# Patient Record
Sex: MALE | Race: WHITE | URBAN - METROPOLITAN AREA
[De-identification: names, ages, dates, MRNs, and addresses within clinical notes are randomized per-mention and may not be internally consistent; named-entity substitution may affect disease eponyms.]

---

## 2022-03-11 ENCOUNTER — TELEPHONE (OUTPATIENT)
Dept: BARIATRICS/WEIGHT MGMT | Age: 22
End: 2022-03-11

## 2022-04-14 ENCOUNTER — OFFICE VISIT (OUTPATIENT)
Dept: BARIATRICS/WEIGHT MGMT | Age: 22
End: 2022-04-14
Payer: COMMERCIAL

## 2022-04-14 ENCOUNTER — TELEPHONE (OUTPATIENT)
Dept: BARIATRICS/WEIGHT MGMT | Age: 22
End: 2022-04-14

## 2022-04-14 VITALS
BODY MASS INDEX: 45.1 KG/M2 | HEIGHT: 70 IN | TEMPERATURE: 97.9 F | DIASTOLIC BLOOD PRESSURE: 70 MMHG | HEART RATE: 68 BPM | WEIGHT: 315 LBS | SYSTOLIC BLOOD PRESSURE: 135 MMHG

## 2022-04-14 DIAGNOSIS — E66.01 CLASS 3 SEVERE OBESITY DUE TO EXCESS CALORIES WITHOUT SERIOUS COMORBIDITY WITH BODY MASS INDEX (BMI) OF 60.0 TO 69.9 IN ADULT (HCC): ICD-10-CM

## 2022-04-14 DIAGNOSIS — G47.33 OSA (OBSTRUCTIVE SLEEP APNEA): Primary | ICD-10-CM

## 2022-04-14 PROBLEM — E66.813 CLASS 3 SEVERE OBESITY DUE TO EXCESS CALORIES WITHOUT SERIOUS COMORBIDITY WITH BODY MASS INDEX (BMI) OF 60.0 TO 69.9 IN ADULT (HCC): Status: ACTIVE | Noted: 2022-04-14

## 2022-04-14 PROCEDURE — 99204 OFFICE O/P NEW MOD 45 MIN: CPT | Performed by: INTERNAL MEDICINE

## 2022-04-14 PROCEDURE — 99202 OFFICE O/P NEW SF 15 MIN: CPT

## 2022-04-14 RX ORDER — TOPIRAMATE 25 MG/1
25 TABLET ORAL 2 TIMES DAILY
Qty: 120 TABLET | Refills: 1 | Status: SHIPPED
Start: 2022-04-14 | End: 2022-08-24 | Stop reason: SDUPTHER

## 2022-04-14 RX ORDER — LIRAGLUTIDE 6 MG/ML
INJECTION, SOLUTION SUBCUTANEOUS
Qty: 5 PEN | Refills: 3 | Status: SHIPPED
Start: 2022-04-14 | End: 2022-05-27

## 2022-04-14 NOTE — PATIENT INSTRUCTIONS
Breakfast -     one high protein shake                            + 20 grams of fiber. Do this by either eating 12 tablespoons of the original, plain Fiber One cereal every day or 4 tablespoons of wheat dextrin powder (Benefiber or a generic brand) every day. Work up to this amount slowly by starting with only one-eighth to one-fourth of the target amount and then adding another one-eighth to one-fourth every one or two weeks until reaching the target. Lunch -           one high protein shake                           + one a fat snack item     Dinner -          one frozen meal                           + one snack item     Shake options (<200 sam, >25 grams/protein) :  Nectar, Pure Protein, Premier, Fairlife, Boost Max, [employer] Max, BeneProtein and [place] [employer] (which is lactose-free) are milk-based options; Nectar, Premier Protein Clear, IsoPure Protein Drink, and Protein 2 O are water-based options; (Premier Protein Clear, the water-based option, comes in a 20 oz bottle with 20 grams of protein and 90 calories. So you have to drink three each day which increases the cost.)  (Disclaimer: Dietary supplements rarely have their listed ingredients and the amount of each verified by a third party other. Sometimes they give verification for their claims to be GMO and gluten free and to be organic.  However, even such verifications as these may still be untrustworthy.)     Fat snack options (<150 sam, >11 grams of fat): 1 1/2 tablespoon of a oil-based dressing or 4 tablespoons of [place] dressing on a bed of salad greens     Snack options (<100 sam, no sweets): fruit, low fat/high protein [place] yogurt, mozzarella cheese stick, nuts, salad with dressing, peanut butter, chips/crackers/pretzels     Frozen meal options (<350 sam):  Weight Watchers Smart Ones, [employer] Cuisine, Healthy Choice, Zeinab's, Patricia's, etc     Food substitutes for the shakes:  4 oz of baked, grilled or broiled chicken, turkey or fish, 10 egg whites Take a multivitamin daily     Walk 30 min for exercise every day    Start Topiramate 25 mg. Take one tablet twice each day for one week. If the appetite suppression is insufficient, then increase to two tablets twice daily.      Inject 0.6 mg of Saxenda under the skin once each day; every week add 0.6 mg to the daily dose; keep increasing the dose until reaching 3 mg each day

## 2022-04-14 NOTE — PROGRESS NOTES
CC -   ALESSANDRO, Obesity    BACKGROUND -   First visit: 4/14/22     Obesity   Began in childhood  Initial BMI 60.9, Wt 421.1 lbs, Ht 5' 9.75\"  HS Grad wt 305 lbs  Lowest   wt 305 lbs  Highest  wt 421 lbs  Pattern of wt gain: grad  Wt change past yr: none  Most wt lost: none  Other diets attempted: none    Desire to lose weight: 10/10  Problem posed by appetite: 7/10    Initial Diet:    Number of meals per day - 4-5    Number of snacks per day - 2    Meal volume - 9\" plate, sometimes seconds    Fast food/convenience store - 7x/week    Restaurants (not fast food) - 1x/week   Sweets - 2d/week (5 mini-donuts)   Chips - 0d/week   Crackers/pretzels - 2d/week (two handfuls)   Nuts - 0d/week   Peanut Butter - 0d/week   Popcorn - 0d/week   Dried fruit - 1d/week   Whole fruit - 2d/week   Breakfast cereal - 0d/week   Granola/Protein/Energy bar - 7d/week (1-2 Mu-ism Oat)   Sugar sweetened beverages - none   Protein - No supplements   Fiber - No supplements     Exercise:    Gym membership - none    Walking - none (walks at his job a lot)    Running - none    Resistance - none    Aerobic class - none    ______________________    [facility] -  Past Medical History:   Diagnosis Date    ADHD (attention deficit hyperactivity disorder)     Asthma     Class 3 severe obesity due to excess calories without serious comorbidity with body mass index (BMI) of 60.0 to 69.9 in adult (HCC)     ALESSANDRO (obstructive sleep apnea)      Current Outpatient Medications   Medication Sig Dispense Refill    EPINEPHrine (EPIPEN 2-RIK IJ) Inject  as directed. No current facility-administered medications for this visit.        ROS -  Card - no CP  GI - no N/V/D/C    PE -  Gen : /70 (Site: Left Upper Arm, Position: Sitting, Cuff Size: Thigh)   Pulse 68   Temp 97.9 °F (36.6 °C) (Temporal)   Ht 5' 9.75\" (1.772 m)   Wt (!) 421 lb 3.2 oz (191.1 kg)   BMI 60.87 kg/m²    WN, WD, NAD  Heart:  RRR w/o MGR, no Carotid bruits, 4+ LE pitting edema b/l  Lung: Nml resp effort  Psych: Normal mood, CTA b/l   Full affect  Neuro: Moves all ext well  ______________________    HISTORY & ASSESSMENT/PLAN -     Problem 1 - ALESSANDRO   HPI -   Diagnosed 2019   Wears CPAP 7d/wk, full duration of sleep    Assessment - uncontrolled; excess wt is increasing the degree of the underlying airway obstruction and decreasing oxygenation    Plan -  Wt reduction per the plan below    Problem 2 - Obesity   HPI  - See above Background for description   Weight  Date   421.2 lbs 4/14/22  He knows another pt in our clinic through his work. That person has successfully lost a large amount of wt through a VLCD. He would like to implement the same diet plan. Also, he regards his appetite as being high enough to be disruptive to his efforts to adhere to his plan and would therefore like an appetite suppressant. Initial diet plan: VLCD  Initial appetite suppressant: topiramate (pt plans to resume Adderall for his ADHD; therefore phentermine is not an option; given that the use of an amphetamine slightly increases the risk of serotonin syndrome when use with bupropion and venlafaxine, I prefer to not use them as initial agents; however, I would like to use a GLP1-agonist over topiramate if his insurance will cover it.)   Assessment - Uncontrolled   Plan -   Patient Instructions     Breakfast -     one high protein shake                            + 20 grams of fiber. Do this by either eating 12 tablespoons of the original, plain Fiber One cereal every day or 4 tablespoons of wheat dextrin powder (Benefiber or a generic brand) every day. Work up to this amount slowly by starting with only one-eighth to one-fourth of the target amount and then adding another one-eighth to one-fourth every one or two weeks until reaching the target.      Lunch -           one high protein shake                           + one a fat snack item     Dinner -          one frozen meal                           + one snack item Shake options (<200 sam, >25 grams/protein) :  Nectar, Pure Protein, Premier, Fairlife, Boost Max, [employer] Max, BeneProtein and [place] [employer] (which is lactose-free) are milk-based options; Nectar, Premier Protein Clear, IsoPure Protein Drink, and Protein 2 O are water-based options; (Premier Protein Clear, the water-based option, comes in a 20 oz bottle with 20 grams of protein and 90 calories. So you have to drink three each day which increases the cost.)  (Disclaimer: Dietary supplements rarely have their listed ingredients and the amount of each verified by a third party other. Sometimes they give verification for their claims to be GMO and gluten free and to be organic. However, even such verifications as these may still be untrustworthy.)     Fat snack options (<150 sam, >11 grams of fat): 1 1/2 tablespoon of a oil-based dressing or 4 tablespoons of [place] dressing on a bed of salad greens     Snack options (<100 sam, no sweets): fruit, low fat/high protein [place] yogurt, mozzarella cheese stick, nuts, salad with dressing, peanut butter, chips/crackers/pretzels     Frozen meal options (<350 sam):  Weight Watchers Smart Ones, [employer] Cuisine, Healthy Choice, Zeinab's, Patricia's, etc     Food substitutes for the shakes:  4 oz of baked, grilled or broiled chicken, turkey or fish, 10 egg whites     Take a multivitamin daily     Walk 30 min for exercise every day    Start Topiramate 25 mg. Take one tablet twice each day for one week. If the appetite suppression is insufficient, then increase to two tablets twice daily.      Inject 0.6 mg of Saxenda under the skin once each day; every week add 0.6 mg to the daily dose; keep increasing the dose until reaching 3 mg each day    Total time spent on encounter: 52 min    Caroline Calderon MD  Endocrinology/Obesity  4/14/22

## 2022-04-15 NOTE — TELEPHONE ENCOUNTER
Spoke with pt by phone  It is okay to take topiramate while taking Adderall. He will be asking his PCP to start the Adderall back.   Estela 43  04/15/22

## 2022-04-15 NOTE — TELEPHONE ENCOUNTER
Pt was called and notified, pt will start and  the topamax.  Pt is asking if he needs to take the topamax with the abdpaola

## 2022-05-13 DIAGNOSIS — E66.01 CLASS 3 SEVERE OBESITY DUE TO EXCESS CALORIES WITHOUT SERIOUS COMORBIDITY WITH BODY MASS INDEX (BMI) OF 60.0 TO 69.9 IN ADULT (HCC): ICD-10-CM

## 2022-05-13 RX ORDER — TOPIRAMATE 25 MG/1
TABLET ORAL
Qty: 60 TABLET | Refills: 3 | OUTPATIENT
Start: 2022-05-13

## 2022-05-27 ENCOUNTER — OFFICE VISIT (OUTPATIENT)
Dept: BARIATRICS/WEIGHT MGMT | Age: 22
End: 2022-05-27
Payer: COMMERCIAL

## 2022-05-27 VITALS
DIASTOLIC BLOOD PRESSURE: 64 MMHG | TEMPERATURE: 97.6 F | HEIGHT: 70 IN | BODY MASS INDEX: 45.1 KG/M2 | WEIGHT: 315 LBS | HEART RATE: 71 BPM | SYSTOLIC BLOOD PRESSURE: 126 MMHG

## 2022-05-27 DIAGNOSIS — G47.33 OSA (OBSTRUCTIVE SLEEP APNEA): Primary | ICD-10-CM

## 2022-05-27 DIAGNOSIS — E66.01 CLASS 3 SEVERE OBESITY DUE TO EXCESS CALORIES WITHOUT SERIOUS COMORBIDITY WITH BODY MASS INDEX (BMI) OF 60.0 TO 69.9 IN ADULT (HCC): ICD-10-CM

## 2022-05-27 PROCEDURE — 99214 OFFICE O/P EST MOD 30 MIN: CPT | Performed by: INTERNAL MEDICINE

## 2022-05-27 PROCEDURE — 99211 OFF/OP EST MAY X REQ PHY/QHP: CPT

## 2022-05-27 PROCEDURE — 99211 OFF/OP EST MAY X REQ PHY/QHP: CPT | Performed by: INTERNAL MEDICINE

## 2022-05-27 NOTE — PROGRESS NOTES
CC -   ALESSANDRO, Obesity    BACKGROUND -   Last visit: 4/14/22  First visit: 4/14/22     Obesity   Began in childhood  Initial BMI 60.9, Wt 421.1 lbs, Ht 5' 9.75\"  HS Grad wt 305 lbs  Lowest   wt 305 lbs  Highest  wt 421 lbs  Pattern of wt gain: grad  Wt change past yr: none  Most wt lost: none  Other diets attempted: none    Desire to lose weight: 10/10  Problem posed by appetite: 7/10    Initial Diet:    Number of meals per day - 4-5    Number of snacks per day - 2    Meal volume - 9\" plate, sometimes seconds    Fast food/convenience store - 7x/week    Restaurants (not fast food) - 1x/week   Sweets - 2d/week (5 mini-donuts)   Chips - 0d/week   Crackers/pretzels - 2d/week (two handfuls)   Nuts - 0d/week   Peanut Butter - 0d/week   Popcorn - 0d/week   Dried fruit - 1d/week   Whole fruit - 2d/week   Breakfast cereal - 0d/week   Granola/Protein/Energy bar - 7d/week (1-2 [employer] Oat)   Sugar sweetened beverages - none   Protein - No supplements   Fiber - No supplements     Exercise:    Gym membership - none    Walking - none (walks at his job a lot)    Running - none    Resistance - none    Aerobic class - none    ______________________    [facility] -  Past Medical History:   Diagnosis Date    ADHD (attention deficit hyperactivity disorder)     Asthma     Class 3 severe obesity due to excess calories without serious comorbidity with body mass index (BMI) of 60.0 to 69.9 in adult (HCC)     ALESSANDRO (obstructive sleep apnea)      Current Outpatient Medications   Medication Sig Dispense Refill    liraglutide-weight management (SAXENDA) 18 MG/3ML SOPN Inject 0.6 mg of Saxenda under the skin once each day; every week add 0.6 mg to the daily dose; keep increasing the dose until reaching 3 mg each day 5 pen 3    topiramate (TOPAMAX) 25 MG tablet Take 1 tablet by mouth 2 times daily 120 tablet 1    EPINEPHrine (EPIPEN 2-RIK IJ) Inject  as directed. No current facility-administered medications for this visit.        PE -  Gen : BP 126/64 (Site: Left Upper Arm, Position: Sitting, Cuff Size: Thigh)   Pulse 71   Temp 97.6 °F (36.4 °C) (Temporal)   Ht 5' 9.75\" (1.772 m)   Wt (!) 402 lb 9.6 oz (182.6 kg)   BMI 58.18 kg/m²    WN, WD, NAD  Heart:  RRR w/o MGR, no Carotid bruits, 4+ LE pitting edema b/l  Lung: Nml resp effort  Psych: Normal mood, CTA b/l   Full affect  Neuro: Moves all ext well  ______________________    HISTORY & ASSESSMENT/PLAN -     Problem 1 - ALESSANDRO   HPI -   Diagnosed 2019   Wears CPAP 7d/wk, full duration of sleep   Daytime drowsiness, this past week - 7-8/10; attributes the worsening to not sleeping well b/c of sciatica    Assessment - uncontrolled; excess wt is increasing the degree of the underlying airway obstruction and decreasing oxygenation    Plan -  Wt reduction per the plan below    Problem 2 - Obesity   HPI  - See above Background for description   Weight  Date   421.2 lbs 04/14/22 home 422.6 lbs   402.6 lbs 05/27/22 home 397.6 lbs  Total wt loss to date: 25.0 lbs  Avg daily energy variance:   04/14/22 - 05/27/22 = - 23.0 lbs (80,500 sam)/42d = - 1,917 sam/day deficit  He knows another pt in our clinic through his work. That person has successfully lost a large amount of wt through a VLCD. He would like to implement the same diet plan. Also, he regards his appetite as being high enough to be disruptive to his efforts to adhere to his plan and would therefore like an appetite suppressant.   Initial diet plan: VLCD  Initial appetite suppressant: topiramate (pt plans to resume Adderall for his ADHD; therefore phentermine is not an option; given that the use of an amphetamine slightly increases the risk of serotonin syndrome when use with bupropion and venlafaxine, I prefer to not use them as initial agents; however, I would like to use a GLP1-agonist over topiramate if his insurance will cover it.)  Unfortunately, [employer] was not covered  Update:  VLCD - 5-6d/wk  Protein shake - Hakeem Bazan's version Premier  Fiber - little to none  Fat - [place] dressing 1 1/2 Tbsp  Meal - HC, LC  Exercise - none, has very limited  Appetite suppressant - Topiramate 25 mg, two tablets twice daily, appetite suppression, 0-8/10, side effects- none (is having LE parasthesias up to his thigh; he attributes this to sciatica b/c his lower back has been hurting him a lot)   Assessment - Improved, cont with present plan; needs to add fiber; Needs to reduce topiramate to see if paresthesias resolve with doing so   Plan -   Patient Instructions     Breakfast -     one high protein shake                            + 20 grams of fiber. Do this by either eating 12 tablespoons of the original, plain Fiber One cereal every day or 4 tablespoons of wheat dextrin powder (Benefiber or a generic brand) every day. Work up to this amount slowly by starting with only one-eighth to one-fourth of the target amount and then adding another one-eighth to one-fourth every one or two weeks until reaching the target. Lunch -           one high protein shake                           + one a fat snack item     Dinner -          one frozen meal                           + one snack item     Shake options (<200 sam, >25 grams/protein) :  Nectar, Pure Protein, Premier, Fairlife, Boost Max, [email or web address] Max, BeneProtein and [place] (which is lactose-free) are milk-based options; Nectar, Premier Protein Clear, IsoPure Protein Drink, and Protein 2 O are water-based options; (Premier Protein Clear, the water-based option, comes in a 20 oz bottle with 20 grams of protein and 90 calories. So you have to drink three each day which increases the cost.)  (Disclaimer: Dietary supplements rarely have their listed ingredients and the amount of each verified by a third party other. Sometimes they give verification for their claims to be GMO and gluten free and to be organic.  However, even such verifications as these may still be untrustworthy.)     Fat snack options (<150 sam, >11 grams of fat): 1 1/2 tablespoon of a oil-based dressing or 4 tablespoons of [place] dressing on a bed of salad greens     Snack options (<100 sam, no sweets): fruit, low fat/high protein [place] yogurt, mozzarella cheese stick, nuts, salad with dressing, peanut butter, chips/crackers/pretzels     Frozen meal options (<350 sam):  Weight Watchers Smart Ones, [employer] Cuisine, Healthy Choice, Zeinab's, Patricia's, etc     Food substitutes for the shakes:  4 oz of baked, grilled or broiled chicken, turkey or fish, 10 egg whites     Take a multivitamin daily     Walk 30 min for exercise every day    Decrease Topiramate 25 mg to one tablet twice each day    Follow up  RTC 4-6 weeks     Noam Nolasco MD  Endocrinology/Obesity  5/27/22

## 2022-05-27 NOTE — PATIENT INSTRUCTIONS
Breakfast -     one high protein shake                            + 20 grams of fiber. Do this by either eating 12 tablespoons of the original, plain Fiber One cereal every day or 4 tablespoons of wheat dextrin powder (Benefiber or a generic brand) every day. Work up to this amount slowly by starting with only one-eighth to one-fourth of the target amount and then adding another one-eighth to one-fourth every one or two weeks until reaching the target. Lunch -           one high protein shake                           + one a fat snack item     Dinner -          one frozen meal                           + one snack item     Shake options (<200 sam, >25 grams/protein) :  Nectar, Pure Protein, Premier, Fairlife, Boost Max, [place] [employer] Max, BeneProtein and [place] [employer] (which is lactose-free) are milk-based options; Nectar, Premier Protein Clear, IsoPure Protein Drink, and Protein 2 O are water-based options; (Premier Protein Clear, the water-based option, comes in a 20 oz bottle with 20 grams of protein and 90 calories. So you have to drink three each day which increases the cost.)  (Disclaimer: Dietary supplements rarely have their listed ingredients and the amount of each verified by a third party other. Sometimes they give verification for their claims to be GMO and gluten free and to be organic.  However, even such verifications as these may still be untrustworthy.)     Fat snack options (<150 sam, >11 grams of fat): 1 1/2 tablespoon of a oil-based dressing or 4 tablespoons of [place] dressing on a bed of salad greens     Snack options (<100 sam, no sweets): fruit, low fat/high protein [place] yogurt, mozzarella cheese stick, nuts, salad with dressing, peanut butter, chips/crackers/pretzels     Frozen meal options (<350 sam):  Weight Watchers Smart Ones, [employer] Cuisine, Healthy Choice, Zeinab's, Patricia's, etc     Food substitutes for the shakes:  4 oz of baked, grilled or broiled chicken, turkey or fish, 10 egg whites

## 2022-07-20 ENCOUNTER — OFFICE VISIT (OUTPATIENT)
Dept: FAMILY MEDICINE CLINIC | Age: 22
End: 2022-07-20
Payer: COMMERCIAL

## 2022-07-20 VITALS
TEMPERATURE: 97.6 F | BODY MASS INDEX: 46.65 KG/M2 | RESPIRATION RATE: 17 BRPM | SYSTOLIC BLOOD PRESSURE: 131 MMHG | HEIGHT: 69 IN | DIASTOLIC BLOOD PRESSURE: 82 MMHG | WEIGHT: 315 LBS

## 2022-07-20 DIAGNOSIS — J40 SINOBRONCHITIS: Primary | ICD-10-CM

## 2022-07-20 DIAGNOSIS — J32.9 SINOBRONCHITIS: Primary | ICD-10-CM

## 2022-07-20 DIAGNOSIS — U07.1 COVID-19: ICD-10-CM

## 2022-07-20 LAB
Lab: ABNORMAL
QC PASS/FAIL: ABNORMAL
SARS-COV-2 RDRP RESP QL NAA+PROBE: POSITIVE

## 2022-07-20 PROCEDURE — 87635 SARS-COV-2 COVID-19 AMP PRB: CPT | Performed by: NURSE PRACTITIONER

## 2022-07-20 PROCEDURE — 99202 OFFICE O/P NEW SF 15 MIN: CPT | Performed by: NURSE PRACTITIONER

## 2022-07-20 RX ORDER — PREDNISONE 10 MG/1
10 TABLET ORAL 2 TIMES DAILY
Qty: 10 TABLET | Refills: 0 | Status: SHIPPED | OUTPATIENT
Start: 2022-07-20 | End: 2022-07-25

## 2022-07-20 RX ORDER — AMOXICILLIN AND CLAVULANATE POTASSIUM 875; 125 MG/1; MG/1
1 TABLET, FILM COATED ORAL 2 TIMES DAILY
Qty: 14 TABLET | Refills: 0 | Status: SHIPPED | OUTPATIENT
Start: 2022-07-20 | End: 2022-07-27

## 2022-07-20 SDOH — ECONOMIC STABILITY: FOOD INSECURITY: WITHIN THE PAST 12 MONTHS, YOU WORRIED THAT YOUR FOOD WOULD RUN OUT BEFORE YOU GOT MONEY TO BUY MORE.: NEVER TRUE

## 2022-07-20 SDOH — ECONOMIC STABILITY: FOOD INSECURITY: WITHIN THE PAST 12 MONTHS, THE FOOD YOU BOUGHT JUST DIDN'T LAST AND YOU DIDN'T HAVE MONEY TO GET MORE.: NEVER TRUE

## 2022-07-20 ASSESSMENT — PATIENT HEALTH QUESTIONNAIRE - PHQ9
1. LITTLE INTEREST OR PLEASURE IN DOING THINGS: 0
SUM OF ALL RESPONSES TO PHQ QUESTIONS 1-9: 0
DEPRESSION UNABLE TO ASSESS: FUNCTIONAL CAPACITY MOTIVATION LIMITS ACCURACY
SUM OF ALL RESPONSES TO PHQ QUESTIONS 1-9: 0
2. FEELING DOWN, DEPRESSED OR HOPELESS: 0
SUM OF ALL RESPONSES TO PHQ QUESTIONS 1-9: 0
SUM OF ALL RESPONSES TO PHQ QUESTIONS 1-9: 0
SUM OF ALL RESPONSES TO PHQ9 QUESTIONS 1 & 2: 0

## 2022-07-20 ASSESSMENT — SOCIAL DETERMINANTS OF HEALTH (SDOH): HOW HARD IS IT FOR YOU TO PAY FOR THE VERY BASICS LIKE FOOD, HOUSING, MEDICAL CARE, AND HEATING?: NOT HARD AT ALL

## 2022-07-20 NOTE — PATIENT INSTRUCTIONS
[address], [facility] [place] [address]  Phone: [phone]  Fax: [phone]    RAO Rebollar CNP      7/20/2022     Patient: Emeli Murray   YOB: 2000       To Whom It May Concern: It is my medical opinion that Emeli Murray should remain out of work while acutely ill or awaiting COVID-19 test results. Patient tested positive in our office today & reports symptoms began yesterday. Return to work with no retesting should be followed if test is negative AND meets any/all these criteria as outlined by CDC/ODH:   No fever without the use of fever reducers for 24 hours  Improvement in symptoms  At least 5 days since the onset of symptoms     If tests positive for COVID-19, needs minimum of 5 days strict quarantine based upon CDC guidelines, improvement of symptoms and 24 hours fever free without fever reducing medications. There is no need to retest following initial diagnosis for a return to work. Pt has been instructed to follow up with their PCP if symptoms persist.    If you have any questions or concerns, please don't hesitate to call.     Sincerely,        RAO Rebollar CNP

## 2022-08-24 ENCOUNTER — TELEMEDICINE (OUTPATIENT)
Dept: BARIATRICS/WEIGHT MGMT | Age: 22
End: 2022-08-24
Payer: COMMERCIAL

## 2022-08-24 DIAGNOSIS — E66.01 CLASS 3 SEVERE OBESITY DUE TO EXCESS CALORIES WITHOUT SERIOUS COMORBIDITY WITH BODY MASS INDEX (BMI) OF 60.0 TO 69.9 IN ADULT (HCC): ICD-10-CM

## 2022-08-24 DIAGNOSIS — G47.33 OSA (OBSTRUCTIVE SLEEP APNEA): Primary | ICD-10-CM

## 2022-08-24 PROCEDURE — 99214 OFFICE O/P EST MOD 30 MIN: CPT | Performed by: INTERNAL MEDICINE

## 2022-08-24 RX ORDER — TOPIRAMATE 25 MG/1
50 TABLET ORAL 2 TIMES DAILY
Qty: 360 TABLET | Refills: 1 | Status: SHIPPED | OUTPATIENT
Start: 2022-08-24

## 2022-08-24 NOTE — PROGRESS NOTES
2022    TELEHEALTH EVALUATION -- Audio/Visual (During SKXDH-86 public health emergency)    CC:  Geraldine Ivey (:  2000) has requested an audio/video evaluation for the following concern(s):  ALESSANDRO, Obesity    BACKGROUND -   Last visit: 22  First visit: 22    Obesity   Began in childhood  Initial BMI 60.9, Wt 421.1 lbs, Ht 5' 9.75\"  HS Grad wt 305 lbs  Lowest   wt 305 lbs  Highest  wt 421 lbs  Pattern of wt gain: grad  Wt change past yr: none  Most wt lost: none  Other diets attempted: none    Desire to lose weight: 10/10  Problem posed by appetite: 7/10    Initial Diet:    Number of meals per day - 4-5    Number of snacks per day - 2    Meal volume - 9\" plate, sometimes seconds    Fast food/convenience store - 7x/week    Restaurants (not fast food) - 1x/week   Sweets - 2d/week (5 mini-donuts)   Chips - 0d/week   Crackers/pretzels - 2d/week (two handfuls)   Nuts - 0d/week   Peanut Butter - 0d/week   Popcorn - 0d/week   Dried fruit - 1d/week   Whole fruit - 2d/week   Breakfast cereal - 0d/week   Granola/Protein/Energy bar - 7d/week (1-2 Congregational Oat)   Sugar sweetened beverages - none   Protein - No supplements   Fiber - No supplements     Exercise:    Gym membership - none    Walking - none (walks at his job a lot)    Running - none    Resistance - none    Aerobic class - none    ______________________    [facility] -  Past Medical History:   Diagnosis Date    ADHD (attention deficit hyperactivity disorder)     Asthma     Class 3 severe obesity due to excess calories without serious comorbidity with body mass index (BMI) of 60.0 to 69.9 in adult (HCC)     ALESSANDRO (obstructive sleep apnea)      Current Outpatient Medications   Medication Sig Dispense Refill    topiramate (TOPAMAX) 25 MG tablet Take 1 tablet by mouth 2 times daily 120 tablet 1    EPINEPHrine (EPIPEN 2-RIK IJ) Inject  as directed. No current facility-administered medications for this visit.        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)  Assessment - uncontrolled; excess wt is increasing the degree of the underlying airway obstruction and decreasing oxygenation   Plan -  Wt reduction per the plan below    Problem 2 - Obesity   HPI  - See above Background for description   Weight  Date   421.2 lbs 04/14/22 home 422.6 lbs   402.6 lbs 05/27/22 home 397.6 lbs   ---------- 08/24/22 home 395.6 lbs (8/23/22, checked at night)  Total wt loss to date: 25.0 lbs  Avg daily energy variance:   04/14/22 - 05/27/22 = - 23.0 lbs/42d = - 1,917 sam/day deficit   05/27/22 - 08/24/22 = -  2.0 lbs/89d = -  78 sam/day deficit  He knows another pt in our clinic through his work. That person has successfully lost a large amount of wt through a VLCD. He would like to implement the same diet plan. Also, he regards his appetite as being high enough to be disruptive to his efforts to adhere to his plan and would therefore like an appetite suppressant. Initial diet plan: VLCD  Initial appetite suppressant: topiramate (pt plans to resume Adderall for his ADHD; therefore phentermine is not an option; given that the use of an amphetamine slightly increases the risk of serotonin syndrome when use with bupropion and venlafaxine, I prefer to not use them as initial agents; however, I would like to use a GLP1-agonist over topiramate if his insurance will cover it.)  Unfortunately, Elvis Herring was not covered                                  Update:  Has not been following his plan b/c of the upheaval in his schedule from his working on renovations on his house  Appetite suppressant - Stopped taking topiramate b/c of paresthesias.  It cont'd for 2-3 weeks and then resolved; he resumed it after four weeks; now taking 25mg, two tab twice daily; appetite suppression 7/10 before 3pm, 4/10 after 3pm; side effects - none  He would like to trial increasing the dose  Assessment - Improved but rate of wt loss significantly slowed; needs better appetite suppression at night; I suggested spreading the dosing throughout the waking hours, but also told him he could increase it to a higher total daily dose if he wanted to risk the paresthesias  Plan -   Patient Instructions     Breakfast -     one high protein shake                            + 20 grams of fiber. Do this by either eating 12 tablespoons of the original, plain Fiber One cereal every day or 4 tablespoons of wheat dextrin powder (Benefiber or a generic brand) every day. Work up to this amount slowly by starting with only one-eighth to one-fourth of the target amount and then adding another one-eighth to one-fourth every one or two weeks until reaching the target. Lunch -           one high protein shake                           + one a fat snack item     Dinner -          one frozen meal                           + one snack item     Shake options (<200 sam, >25 grams/protein) :  Nectar, Pure Protein, Premier, Fairlife, Boost Max, blinkbox music Max, BeneProtein and [employer] (which is lactose-free) are milk-based options; Nectar, Premier Protein Clear, IsoPure Protein Drink, and Protein 2 O are water-based options; (Premier Protein Clear, the water-based option, comes in a 20 oz bottle with 20 grams of protein and 90 calories. So you have to drink three each day which increases the cost.)  (Disclaimer: Dietary supplements rarely have their listed ingredients and the amount of each verified by a third party other. Sometimes they give verification for their claims to be GMO and gluten free and to be organic.  However, even such verifications as these may still be untrustworthy.)     Fat snack options (<150 sam, >11 grams of fat): 1 1/2 tablespoon of a oil-based dressing or 4 tablespoons of [place] dressing on a bed of salad greens     Snack options (<100 sam, no sweets): fruit, low fat/high protein [place] yogurt, mozzarella cheese stick, nuts, salad with dressing, peanut butter, chips/crackers/pretzels     Frozen meal options (<350 sam):  Weight Watchers Smart Ones, [employer] Cuisine, Healthy Choice, Zeinab's, Patricia's, etc     Food substitutes for the shakes:  4 oz of baked, grilled or broiled chicken, turkey or fish, 10 egg whites     Take a multivitamin daily     Walk 30 min for exercise every day    TakeTopiramate 25 mg, two tablets twice each day (consider experimenting with one tablet every four hours during the day for a total of four tablets/day;  may also increase to 3 tablets twice daily)    Have a basic metabolic panel performed in one week    See me back in 5-7 weeks    Medication management: topiramate    An  electronic signature was used to authenticate this note. --Jorje Raygoza MD on 8/29/2022 at 10:26 PM      Pursuant to the emergency declaration under the [facility], 1135 waiver authority and the [employer] and [employer] Act, this Virtual  Visit was conducted, with patient's consent, to reduce the patient's risk of exposure to COVID-19 and provide continuity of care for an established patient. Services were provided through a video synchronous discussion virtually to substitute for in-person clinic visit.

## 2022-08-24 NOTE — PATIENT INSTRUCTIONS
Take a multivitamin daily     Walk 30 min for exercise every day    TakeTopiramate 25 mg, two tablets twice each day (consider experimenting with one tablet every four hours during the day for a total of four tablets/day; you may also increase to 3 tablets twice daily)    Have a basic metabolic panel performed in one week    See me back in 5-7 weeks

## 2022-10-17 ENCOUNTER — TELEPHONE (OUTPATIENT)
Dept: BARIATRICS/WEIGHT MGMT | Age: 22
End: 2022-10-17

## 2022-10-17 NOTE — TELEPHONE ENCOUNTER
patient called and left voicemail to reschedule .  called patient back no answer cx current appt per patient request

## 2023-02-17 ENCOUNTER — HOSPITAL ENCOUNTER (EMERGENCY)
Age: 23
Discharge: HOME | End: 2023-02-17
Payer: COMMERCIAL

## 2023-02-17 VITALS
RESPIRATION RATE: 22 BRPM | OXYGEN SATURATION: 97 % | SYSTOLIC BLOOD PRESSURE: 162 MMHG | DIASTOLIC BLOOD PRESSURE: 87 MMHG | HEART RATE: 90 BPM | BODY MASS INDEX: 62.02 KG/M2 | WEIGHT: 315 LBS

## 2023-02-17 DIAGNOSIS — R04.0 EPISTAXIS: ICD-10-CM

## 2023-02-17 DIAGNOSIS — Z01.20 ENCOUNTER FOR DENTAL EXAMINATION: Primary | ICD-10-CM

## 2023-02-17 PROCEDURE — 99283 EMERGENCY DEPT VISIT LOW MDM: CPT

## 2023-02-17 PROCEDURE — 6370000000 HC RX 637 (ALT 250 FOR IP): Performed by: PHYSICIAN ASSISTANT

## 2023-02-17 RX ORDER — HYDROCODONE BITARTRATE AND ACETAMINOPHEN 5; 325 MG/1; MG/1
1 TABLET ORAL EVERY 6 HOURS PRN
COMMUNITY

## 2023-02-17 RX ORDER — IBUPROFEN 800 MG/1
800 TABLET ORAL EVERY 6 HOURS PRN
COMMUNITY

## 2023-02-17 RX ORDER — AMOXICILLIN 500 MG/1
500 CAPSULE ORAL 3 TIMES DAILY
COMMUNITY

## 2023-02-17 RX ORDER — AMOXICILLIN 250 MG/1
500 CAPSULE ORAL ONCE
Status: COMPLETED | OUTPATIENT
Start: 2023-02-17 | End: 2023-02-17

## 2023-02-17 RX ADMIN — AMOXICILLIN 500 MG: 250 CAPSULE ORAL at 21:25

## 2023-02-17 ASSESSMENT — PAIN - FUNCTIONAL ASSESSMENT: PAIN_FUNCTIONAL_ASSESSMENT: NONE - DENIES PAIN

## 2023-02-17 ASSESSMENT — LIFESTYLE VARIABLES: HOW OFTEN DO YOU HAVE A DRINK CONTAINING ALCOHOL: MONTHLY OR LESS

## 2023-02-18 NOTE — ED PROVIDER NOTES
Independent AUTUMN Visit. HPI:  2/17/23, Time: 8:47 PM TARA Jefferson is a 23 y.o. male presenting to the ED for bleeding from dental extraction, right-sided at this time, beginning this evening  . The complaint has been intermittent, mild in severity, and worsened by nothing. Patient comes in with complaint of bleeding from the area of his wisdom tooth extraction. Patient occurred earlier this morning he took a nap woke up and noticed blood in his mouth and right-sided nares with blood. He is concerned that one of the stitches dehisced states he did not start the amoxicillin patient was placed on Norco Motrin and amoxicillin  Review of Systems:   A complete review of systems was performed and pertinent positives and negatives are stated within HPI, all other systems reviewed and are negative.          --------------------------------------------- PAST HISTORY ---------------------------------------------  Past Medical History:  has a past medical history of ADHD (attention deficit hyperactivity disorder), Asthma, Class 3 severe obesity due to excess calories without serious comorbidity with body mass index (BMI) of 60.0 to 69.9 in adult ([facility] and ALESSANDRO (obstructive sleep apnea). Past Surgical History:  has no past surgical history on file. Social History:  reports that he has never smoked. He has never used smokeless tobacco. He reports that he does not drink alcohol and does not use drugs. Family History: family history is not on file. The patients home medications have been reviewed. Allergies: Peanut-containing drug products and Shellfish-derived products    -------------------------------------------------- RESULTS -------------------------------------------------  All laboratory and radiology results have been personally reviewed by myself   LABS:  No results found for this visit on 02/17/23.     RADIOLOGY:  Interpreted by Radiologist.  No orders to display ------------------------- NURSING NOTES AND VITALS REVIEWED ---------------------------   The nursing notes within the ED encounter and vital signs as below have been reviewed. BP (!) 162/87   Pulse 90   Resp 22   Wt (!) 420 lb (190.5 kg)   SpO2 97%   BMI 62.02 kg/m²   Oxygen Saturation Interpretation: Normal      ---------------------------------------------------PHYSICAL EXAM--------------------------------------      Constitutional/General: Alert and oriented x3, well appearing, non toxic in NAD  Head: Normocephalic and atraumatic  Eyes: PERRL, EOMI  Nose no active bleeding from the nares present  Mouth: Oropharynx clear, handling secretions, no trismus there is no active bleeding of the right posterior gumline. She no blood within the posterior pharynx  Neck: Supple, full ROM,   Pulmonary: Lungs clear to auscultation bilaterally, no wheezes, rales, or rhonchi. Not in respiratory distress  Cardiovascular:  Regular rate and rhythm, no murmurs, gallops, or rubs. 2+ distal pulses  Abdomen: Soft, non tender, non distended,   Extremities: Moves all extremities x 4. Warm and well perfused  Skin: warm and dry without rash  Neurologic: GCS 15,  Psych: Normal Affect      ------------------------------ ED COURSE/MEDICAL DECISION MAKING----------------------  Medications   amoxicillin (AMOXIL) capsule 500 mg (500 mg Oral Given 2/17/23 2125)         ED COURSE:     Medical Decision Making  Patient comes in with complaint of bleeding from the area of his wisdom tooth extraction. Patient occurred earlier this morning he took a nap woke up and noticed blood in his mouth and right-sided nares with blood.   He is concerned that one of the stitches dehisced states he did not start the amoxicillin patient was placed on Norco Motrin and amoxicillin bleeding is controlled here in the ER he is to continue with medications as previously prescribed follow-up with his dentist on Monday    Risk  Prescription drug management. Medical Decision Making    Patient presents for bleeding from dental extraction  Social Determinants include   Social Connections: Not on file    Social Determinants : None. Chronic conditions    Past Medical History:   Diagnosis Date    ADHD (attention deficit hyperactivity disorder)     Asthma     Class 3 severe obesity due to excess calories without serious comorbidity with body mass index (BMI) of 60.0 to 69.9 in adult (HCC)     ALESSANDRO (obstructive sleep apnea)    . Physical exam Patient presents to the ER for Nose no active bleeding from the nares present  Mouth: Oropharynx clear, handling secretions, no trismus there is no active bleeding of the right posterior gumline. She no blood within the posterior pharynx. .  Vital signs. /87Temp --Heart Rate 90Resp 22SpO2 97%Wt   420 lb    (190.5 kg)  Ht   5' 9\"    (175.3 cm  Differential diagnoses include but not limited to bleeding from site. Epistaxis. Infection. Dehiscence of wound. .  Consults included none. Results were discussed with patient . Patient was given amoxicillin 500 mg p.o. x1 for their symptoms with no improvement. PatientSpO2 97% will be patient is to continue with amoxicillin Motrin and Norco as previously prescribed discussed appropriate use and potential side effects of starting the prescribed medications. Patient continues to be non-toxic on re-evaluation. Findings were discussed with the patient and reasons to immediately return to the ED were articulated to them. They will follow-up with their PMD and dentist.      Discharge Instructions:   Patient referred to  your dentist    Call in 3 days      MEDICATIONS:   DISCHARGE MEDICATIONS:  New Prescriptions    No medications on file       DISCONTINUED MEDICATIONS:  Discontinued Medications    No medications on file       Record Review:  Records Reviewed : None       Disposition Considerations:   This patient's ED course included: a personal history and physicial examination  This patient has remained hemodynamically stable during their ED course. I emphasized the importance of follow-up with the physician I referred them to in the timeframe recommended. I discussed with the patient emergent symptoms and the need to immediately return to the ER. Written information was included in their discharge instructions. Additional verbal discharge instructions were also given and discussed with the patient to supplement those generated by the EMR. We also discussed medications that were prescribed  (if any) including common side effects and interactions. The patient was advised to abstain from driving, operating heavy machinery or making significant decisions while taking medications such as opiates and muscle relaxers that may impair this. All questions were addressed. They understand return precautions and discharge instructions. The patient  expressed understanding. Vitals were stable and they were in no distress at discharge. Counseling: The emergency provider has spoken with the patient and discussed todays results, in addition to providing specific details for the plan of care and counseling regarding the diagnosis and prognosis. Questions are answered at this time and they are agreeable with the plan.      --------------------------------- IMPRESSION AND DISPOSITION ---------------------------------    IMPRESSION  1. Encounter for dental examination    2. Epistaxis        DISPOSITION  Disposition: Discharge to home  Patient condition is good      NOTE: This report was transcribed using voice recognition software.  Every effort was made to ensure accuracy; however, inadvertent computerized transcription errors may be present      [place], [place]  02/17/23 1186

## 2023-09-06 ENCOUNTER — OFFICE VISIT (OUTPATIENT)
Dept: PRIMARY CARE | Facility: CLINIC | Age: 23
End: 2023-09-06
Payer: COMMERCIAL

## 2023-09-06 VITALS
OXYGEN SATURATION: 96 % | DIASTOLIC BLOOD PRESSURE: 80 MMHG | HEART RATE: 88 BPM | SYSTOLIC BLOOD PRESSURE: 134 MMHG | WEIGHT: 315 LBS | HEIGHT: 69 IN | BODY MASS INDEX: 46.65 KG/M2

## 2023-09-06 DIAGNOSIS — R53.83 FATIGUE, UNSPECIFIED TYPE: Primary | ICD-10-CM

## 2023-09-06 DIAGNOSIS — Z13.220 ENCOUNTER FOR LIPID SCREENING FOR CARDIOVASCULAR DISEASE: ICD-10-CM

## 2023-09-06 DIAGNOSIS — M65.4 DE QUERVAIN'S DISEASE (RADIAL STYLOID TENOSYNOVITIS): ICD-10-CM

## 2023-09-06 DIAGNOSIS — G62.9 NEUROPATHY: ICD-10-CM

## 2023-09-06 DIAGNOSIS — Z13.6 ENCOUNTER FOR LIPID SCREENING FOR CARDIOVASCULAR DISEASE: ICD-10-CM

## 2023-09-06 DIAGNOSIS — G56.01 CARPAL TUNNEL SYNDROME OF RIGHT WRIST: ICD-10-CM

## 2023-09-06 PROBLEM — G56.20 ULNAR NERVE ENTRAPMENT: Status: ACTIVE | Noted: 2023-09-06

## 2023-09-06 PROBLEM — L01.00 IMPETIGO: Status: RESOLVED | Noted: 2023-09-06 | Resolved: 2023-09-06

## 2023-09-06 PROBLEM — R06.83 SNORING: Status: RESOLVED | Noted: 2023-09-06 | Resolved: 2023-09-06

## 2023-09-06 PROBLEM — J45.909 ASTHMA (HHS-HCC): Status: ACTIVE | Noted: 2023-09-06

## 2023-09-06 PROBLEM — J30.9 ALLERGIC RHINITIS: Status: ACTIVE | Noted: 2023-09-06

## 2023-09-06 PROBLEM — F90.9 ATTENTION-DEFICIT/HYPERACTIVITY DISORDER: Status: ACTIVE | Noted: 2023-09-06

## 2023-09-06 PROBLEM — G47.33 OBSTRUCTIVE SLEEP APNEA SYNDROME: Status: ACTIVE | Noted: 2022-04-14

## 2023-09-06 PROBLEM — D48.5 NEOPLASM OF UNCERTAIN BEHAVIOR OF SKIN OF AXILLA: Status: RESOLVED | Noted: 2023-09-06 | Resolved: 2023-09-06

## 2023-09-06 PROBLEM — M79.602 PAIN OF LEFT UPPER EXTREMITY: Status: RESOLVED | Noted: 2023-09-06 | Resolved: 2023-09-06

## 2023-09-06 PROBLEM — S06.0X0A CONCUSSION WITH NO LOSS OF CONSCIOUSNESS: Status: RESOLVED | Noted: 2023-09-06 | Resolved: 2023-09-06

## 2023-09-06 PROBLEM — J45.990 EXERCISE-INDUCED BRONCHOSPASM (HHS-HCC): Status: ACTIVE | Noted: 2023-09-06

## 2023-09-06 PROBLEM — L30.9 ECZEMA: Status: ACTIVE | Noted: 2023-09-06

## 2023-09-06 PROBLEM — H66.001 ACUTE SUPPUR RIGHT OTITIS MEDIA W/O SPONTAN RUPTURE TYMPANIC MEMBRANE: Status: RESOLVED | Noted: 2023-09-06 | Resolved: 2023-09-06

## 2023-09-06 PROBLEM — R45.4 IRRITABILITY: Status: ACTIVE | Noted: 2023-09-06

## 2023-09-06 LAB
ALANINE AMINOTRANSFERASE (SGPT) (U/L) IN SER/PLAS: 36 U/L (ref 10–52)
ALBUMIN (G/DL) IN SER/PLAS: 4.7 G/DL (ref 3.4–5)
ALKALINE PHOSPHATASE (U/L) IN SER/PLAS: 66 U/L (ref 33–120)
ANION GAP IN SER/PLAS: 13 MMOL/L (ref 10–20)
ASPARTATE AMINOTRANSFERASE (SGOT) (U/L) IN SER/PLAS: 23 U/L (ref 9–39)
BASOPHILS (10*3/UL) IN BLOOD BY AUTOMATED COUNT: 0.06 X10E9/L (ref 0–0.1)
BASOPHILS/100 LEUKOCYTES IN BLOOD BY AUTOMATED COUNT: 0.6 % (ref 0–2)
BILIRUBIN TOTAL (MG/DL) IN SER/PLAS: 1 MG/DL (ref 0–1.2)
CALCIUM (MG/DL) IN SER/PLAS: 9.5 MG/DL (ref 8.6–10.3)
CARBON DIOXIDE, TOTAL (MMOL/L) IN SER/PLAS: 26 MMOL/L (ref 21–32)
CHLORIDE (MMOL/L) IN SER/PLAS: 105 MMOL/L (ref 98–107)
CHOLESTEROL (MG/DL) IN SER/PLAS: 168 MG/DL (ref 0–199)
CHOLESTEROL IN HDL (MG/DL) IN SER/PLAS: 33.8 MG/DL
CHOLESTEROL/HDL RATIO: 5
CREATININE (MG/DL) IN SER/PLAS: 1.04 MG/DL (ref 0.5–1.3)
EOSINOPHILS (10*3/UL) IN BLOOD BY AUTOMATED COUNT: 0.26 X10E9/L (ref 0–0.7)
EOSINOPHILS/100 LEUKOCYTES IN BLOOD BY AUTOMATED COUNT: 2.7 % (ref 0–6)
ERYTHROCYTE DISTRIBUTION WIDTH (RATIO) BY AUTOMATED COUNT: 13.1 % (ref 11.5–14.5)
ERYTHROCYTE MEAN CORPUSCULAR HEMOGLOBIN CONCENTRATION (G/DL) BY AUTOMATED: 31.1 G/DL (ref 32–36)
ERYTHROCYTE MEAN CORPUSCULAR VOLUME (FL) BY AUTOMATED COUNT: 90 FL (ref 80–100)
ERYTHROCYTES (10*6/UL) IN BLOOD BY AUTOMATED COUNT: 5.12 X10E12/L (ref 4.5–5.9)
GFR MALE: >90 ML/MIN/1.73M2
GLUCOSE (MG/DL) IN SER/PLAS: 75 MG/DL (ref 74–99)
HEMATOCRIT (%) IN BLOOD BY AUTOMATED COUNT: 46 % (ref 41–52)
HEMOGLOBIN (G/DL) IN BLOOD: 14.3 G/DL (ref 13.5–17.5)
IMMATURE GRANULOCYTES/100 LEUKOCYTES IN BLOOD BY AUTOMATED COUNT: 0.3 % (ref 0–0.9)
LDL: 59 MG/DL (ref 0–119)
LEUKOCYTES (10*3/UL) IN BLOOD BY AUTOMATED COUNT: 9.8 X10E9/L (ref 4.4–11.3)
LYMPHOCYTES (10*3/UL) IN BLOOD BY AUTOMATED COUNT: 3.21 X10E9/L (ref 1.2–4.8)
LYMPHOCYTES/100 LEUKOCYTES IN BLOOD BY AUTOMATED COUNT: 32.8 % (ref 13–44)
MONOCYTES (10*3/UL) IN BLOOD BY AUTOMATED COUNT: 0.89 X10E9/L (ref 0.1–1)
MONOCYTES/100 LEUKOCYTES IN BLOOD BY AUTOMATED COUNT: 9.1 % (ref 2–10)
NEUTROPHILS (10*3/UL) IN BLOOD BY AUTOMATED COUNT: 5.34 X10E9/L (ref 1.2–7.7)
NEUTROPHILS/100 LEUKOCYTES IN BLOOD BY AUTOMATED COUNT: 54.5 % (ref 40–80)
NON HDL CHOLESTEROL: 134 MG/DL (ref 0–149)
PLATELETS (10*3/UL) IN BLOOD AUTOMATED COUNT: 272 X10E9/L (ref 150–450)
POTASSIUM (MMOL/L) IN SER/PLAS: 4.2 MMOL/L (ref 3.5–5.3)
PROTEIN TOTAL: 7.2 G/DL (ref 6.4–8.2)
SODIUM (MMOL/L) IN SER/PLAS: 140 MMOL/L (ref 136–145)
THYROTROPIN (MIU/L) IN SER/PLAS BY DETECTION LIMIT <= 0.05 MIU/L: 1.49 MIU/L (ref 0.44–3.98)
TRIGLYCERIDE (MG/DL) IN SER/PLAS: 378 MG/DL (ref 0–149)
UREA NITROGEN (MG/DL) IN SER/PLAS: 12 MG/DL (ref 6–23)
VLDL: 76 MG/DL (ref 0–40)

## 2023-09-06 PROCEDURE — 80061 LIPID PANEL: CPT

## 2023-09-06 PROCEDURE — 1036F TOBACCO NON-USER: CPT | Performed by: FAMILY MEDICINE

## 2023-09-06 PROCEDURE — 80053 COMPREHEN METABOLIC PANEL: CPT

## 2023-09-06 PROCEDURE — 84443 ASSAY THYROID STIM HORMONE: CPT

## 2023-09-06 PROCEDURE — 99214 OFFICE O/P EST MOD 30 MIN: CPT | Performed by: FAMILY MEDICINE

## 2023-09-06 PROCEDURE — 85025 COMPLETE CBC W/AUTO DIFF WBC: CPT

## 2023-09-06 RX ORDER — DEXTROAMPHETAMINE SACCHARATE, AMPHETAMINE ASPARTATE MONOHYDRATE, DEXTROAMPHETAMINE SULFATE AND AMPHETAMINE SULFATE 5; 5; 5; 5 MG/1; MG/1; MG/1; MG/1
1 CAPSULE, EXTENDED RELEASE ORAL 2 TIMES DAILY
COMMUNITY
Start: 2019-07-30 | End: 2023-09-06 | Stop reason: ALTCHOICE

## 2023-09-06 RX ORDER — DEXTROAMPHETAMINE SACCHARATE, AMPHETAMINE ASPARTATE MONOHYDRATE, DEXTROAMPHETAMINE SULFATE AND AMPHETAMINE SULFATE 7.5; 7.5; 7.5; 7.5 MG/1; MG/1; MG/1; MG/1
30 CAPSULE, EXTENDED RELEASE ORAL EVERY MORNING
COMMUNITY

## 2023-09-06 RX ORDER — EPINEPHRINE 0.3 MG/.3ML
1 INJECTION INTRAMUSCULAR AS NEEDED
COMMUNITY
Start: 2012-08-17

## 2023-09-06 ASSESSMENT — ENCOUNTER SYMPTOMS
BRUISES/BLEEDS EASILY: 0
POLYPHAGIA: 0
NAUSEA: 0
COUGH: 0
DYSPHORIC MOOD: 0
NERVOUS/ANXIOUS: 0
BACK PAIN: 0
NUMBNESS: 0
CONSTIPATION: 0
TROUBLE SWALLOWING: 0
WHEEZING: 0
TREMORS: 0
CHEST TIGHTNESS: 0
FEVER: 0
VOMITING: 0
EYE PAIN: 0
DIARRHEA: 0
ADENOPATHY: 0
WEAKNESS: 0
HEADACHES: 0
EYE REDNESS: 0
COLOR CHANGE: 0
BLOOD IN STOOL: 0
ARTHRALGIAS: 0
POLYDIPSIA: 0
DYSURIA: 0
ABDOMINAL PAIN: 0
SHORTNESS OF BREATH: 0
CHILLS: 0
FATIGUE: 0
SORE THROAT: 0
PALPITATIONS: 0
FREQUENCY: 0
DIZZINESS: 0
HEMATURIA: 0

## 2023-09-06 NOTE — PROGRESS NOTES
Subjective   Patient ID: Aj Timmons is a 23 y.o. male who presents for Annual Exam (Check up, discuss a few issues ).  HPI  Developing dark spots around neck  Has a lot of tingling in right hand- mostly in fingers (mostly in 1st-3rd)- will start in elbow  Can get in other fingers too  + fatigue  No CP, SOB, palpitations, dizziness, HA, vision changes    Current Outpatient Medications:     EPINEPHrine (EpiPen 2-Noel) 0.3 mg/0.3 mL injection syringe, Inject 0.3 mL (0.3 mg) as directed if needed., Disp: , Rfl:     amphetamine-dextroamphetamine XR (Adderall XR) 30 mg 24 hr capsule, Take 1 capsule (30 mg) by mouth once daily in the morning., Disp: , Rfl:    Past Surgical History:   Procedure Laterality Date    TYMPANOSTOMY TUBE PLACEMENT  09/03/2015    Ear Pressure Equalization Tube, Insertion, Bilaterally      Past Medical History:   Diagnosis Date    Abnormal weight gain 03/13/2014    Abnormal weight gain    Acute suppur right otitis media w/o spontan rupture tympanic membrane 09/06/2023    Acute suppurative otitis media without spontaneous rupture of ear drum, left ear 01/12/2018    Acute suppurative otitis media of left ear without spontaneous rupture of tympanic membrane, recurrence not specified    Concussion with no loss of consciousness 09/06/2023    Concussion without loss of consciousness, initial encounter 02/11/2019    Concussion without loss of consciousness, initial encounter    Cutaneous abscess of limb, unspecified 10/06/2017    Abscess of thigh    Nasal congestion 12/08/2014    Nasal congestion    Overweight 01/31/2014    Overweight    Pain of left upper extremity 09/06/2023    Personal history of other diseases of the circulatory system 03/13/2014    History of hypertension    Personal history of other diseases of the nervous system and sense organs 07/15/2014    History of acute otitis media    Personal history of other diseases of the respiratory system 03/09/2015    History of pharyngitis     Personal history of other diseases of the respiratory system     Personal history of asthma    Personal history of other diseases of the respiratory system 10/26/2017    History of acute sinusitis    Personal history of other endocrine, nutritional and metabolic disease 06/30/2014    History of hyperlipidemia    Personal history of other endocrine, nutritional and metabolic disease 03/09/2015    History of obesity    Personal history of other infectious and parasitic diseases 03/09/2015    History of viral infection    Personal history of other mental and behavioral disorders 03/09/2015    History of attention deficit disorder    Personal history of traumatic brain injury 10/02/2014    History of concussion    Proteinuria, unspecified 03/13/2014    Protein in urine    Proteinuria, unspecified 03/13/2014    Proteinuria    Snoring 09/06/2023     Social History     Tobacco Use    Smoking status: Never    Smokeless tobacco: Never   Substance Use Topics    Drug use: Never      No family history on file.   Review of Systems   Constitutional:  Negative for chills, fatigue and fever.   HENT:  Negative for congestion, ear discharge, ear pain, hearing loss, nosebleeds, sore throat, tinnitus and trouble swallowing.    Eyes:  Negative for pain, redness and visual disturbance.   Respiratory:  Negative for cough, chest tightness, shortness of breath and wheezing.    Cardiovascular:  Negative for chest pain, palpitations and leg swelling.   Gastrointestinal:  Negative for abdominal pain, blood in stool, constipation, diarrhea, nausea and vomiting.   Endocrine: Positive for polyuria. Negative for cold intolerance, heat intolerance, polydipsia and polyphagia.   Genitourinary:  Negative for dysuria, frequency, hematuria and urgency.   Musculoskeletal:  Negative for arthralgias, back pain and gait problem.   Skin:  Negative for color change and rash.   Neurological:  Negative for dizziness, tremors, syncope, weakness, numbness and  "headaches.   Hematological:  Negative for adenopathy. Does not bruise/bleed easily.   Psychiatric/Behavioral:  Negative for dysphoric mood. The patient is not nervous/anxious.        Objective   /80   Pulse 88   Ht 1.753 m (5' 9\")   Wt (!) 196 kg (433 lb)   SpO2 96%   BMI 63.94 kg/m²    Physical Exam  Vitals and nursing note reviewed.   Constitutional:       General: He is not in acute distress.     Appearance: Normal appearance. He is obese.   HENT:      Head: Normocephalic and atraumatic.      Right Ear: Tympanic membrane, ear canal and external ear normal.      Left Ear: Tympanic membrane, ear canal and external ear normal.      Nose: Nose normal.      Mouth/Throat:      Mouth: Mucous membranes are moist.      Pharynx: Oropharynx is clear.   Eyes:      Extraocular Movements: Extraocular movements intact.      Conjunctiva/sclera: Conjunctivae normal.      Pupils: Pupils are equal, round, and reactive to light.   Neck:      Vascular: No carotid bruit.   Cardiovascular:      Rate and Rhythm: Normal rate and regular rhythm.      Pulses: Normal pulses.      Heart sounds: Normal heart sounds. No murmur heard.  Pulmonary:      Effort: Pulmonary effort is normal.      Breath sounds: Normal breath sounds.   Abdominal:      General: Abdomen is flat. Bowel sounds are normal.      Palpations: Abdomen is soft. There is no mass.   Musculoskeletal:         General: Normal range of motion.      Cervical back: Normal range of motion and neck supple.      Comments: + Finkelstein on right  Negative phalen's, reverse phalen's, tinel's at cubital and carpal tunnel on right   Lymphadenopathy:      Cervical: No cervical adenopathy.   Skin:     Capillary Refill: Capillary refill takes less than 2 seconds.   Neurological:      General: No focal deficit present.      Mental Status: He is alert and oriented to person, place, and time.      Cranial Nerves: No cranial nerve deficit.      Motor: No weakness.      Deep Tendon " Reflexes: Reflexes normal.   Psychiatric:         Mood and Affect: Mood normal.         Behavior: Behavior normal.         Assessment/Plan   Problem List Items Addressed This Visit    None  Visit Diagnoses       Fatigue, unspecified type    -  Primary    Relevant Orders    Comprehensive Metabolic Panel (Completed)    TSH with reflex to Free T4 if abnormal (Completed)    Neuropathy        Relevant Orders    CBC and Auto Differential (Completed)    Comprehensive Metabolic Panel (Completed)    Encounter for lipid screening for cardiovascular disease        Relevant Orders    Lipid Panel (Completed)    Carpal tunnel syndrome of right wrist        De Quervain's disease (radial styloid tenosynovitis)            Fatigue/neuropathy- check labs, TLC    Obesity- limit calories, increase CV exercise    CTS- braces, ice    De Quervain- RICE, ibuprofen    Patient understands and agrees with treatment plan    Tyler Shepherd, DO

## 2023-09-07 PROBLEM — E66.813 CLASS 3 SEVERE OBESITY DUE TO EXCESS CALORIES WITHOUT SERIOUS COMORBIDITY WITH BODY MASS INDEX (BMI) OF 60.0 TO 69.9 IN ADULT: Status: ACTIVE | Noted: 2023-09-07

## 2023-09-07 PROBLEM — E66.01 CLASS 3 SEVERE OBESITY DUE TO EXCESS CALORIES WITHOUT SERIOUS COMORBIDITY WITH BODY MASS INDEX (BMI) OF 60.0 TO 69.9 IN ADULT (MULTI): Status: ACTIVE | Noted: 2023-09-07

## 2025-01-21 ENCOUNTER — OFFICE VISIT (OUTPATIENT)
Dept: PRIMARY CARE | Facility: CLINIC | Age: 25
End: 2025-01-21
Payer: COMMERCIAL

## 2025-01-21 VITALS
OXYGEN SATURATION: 98 % | HEART RATE: 68 BPM | DIASTOLIC BLOOD PRESSURE: 90 MMHG | TEMPERATURE: 98.1 F | SYSTOLIC BLOOD PRESSURE: 110 MMHG | BODY MASS INDEX: 53.31 KG/M2 | WEIGHT: 315 LBS

## 2025-01-21 DIAGNOSIS — R05.9 COUGH, UNSPECIFIED TYPE: Primary | ICD-10-CM

## 2025-01-21 PROCEDURE — 87636 SARSCOV2 & INF A&B AMP PRB: CPT

## 2025-01-21 PROCEDURE — 99213 OFFICE O/P EST LOW 20 MIN: CPT

## 2025-01-21 PROCEDURE — 1036F TOBACCO NON-USER: CPT

## 2025-01-21 RX ORDER — ALBUTEROL SULFATE 90 UG/1
2 INHALANT RESPIRATORY (INHALATION) EVERY 4 HOURS PRN
Qty: 8 G | Refills: 5 | Status: SHIPPED | OUTPATIENT
Start: 2025-01-21 | End: 2026-01-21

## 2025-01-21 NOTE — PROGRESS NOTES
Subjective   Patient ID: Aj Timmons is a 24 y.o. male who presents for Cough (Congestion, cough, diarrhea, night sweats, cough started Sunday and other sx started yesterday ).  HPI  - sat night/sunday morning started w raspy cough  - productive cough  - congestion  - pressure in ears but they dont hurt  - low back pain   - diarrhea constantly since saturday   - eating , drinking well   - no sore throat  - waking up w sweats but doesnt think he has had a fever, no chills.   - no vomiting, no abdominal pain   - taking dayquil which helps some  - no cp, sob only when coughing   - no sick contacts  - no recent travel    Current Outpatient Medications:     EPINEPHrine (EpiPen 2-Noel) 0.3 mg/0.3 mL injection syringe, Inject 0.3 mL (0.3 mg) as directed if needed., Disp: , Rfl:     albuterol (Ventolin HFA) 90 mcg/actuation inhaler, Inhale 2 puffs every 4 hours if needed for wheezing or shortness of breath., Disp: 8 g, Rfl: 5   Past Surgical History:   Procedure Laterality Date    TYMPANOSTOMY TUBE PLACEMENT  09/03/2015    Ear Pressure Equalization Tube, Insertion, Bilaterally      Past Medical History:   Diagnosis Date    Abnormal weight gain 03/13/2014    Abnormal weight gain    Acute suppur right otitis media w/o spontan rupture tympanic membrane 09/06/2023    Acute suppurative otitis media without spontaneous rupture of ear drum, left ear 01/12/2018    Acute suppurative otitis media of left ear without spontaneous rupture of tympanic membrane, recurrence not specified    Concussion with no loss of consciousness 09/06/2023    Concussion without loss of consciousness, initial encounter 02/11/2019    Concussion without loss of consciousness, initial encounter    Cutaneous abscess of limb, unspecified 10/06/2017    Abscess of thigh    Nasal congestion 12/08/2014    Nasal congestion    Overweight 01/31/2014    Overweight    Pain of left upper extremity 09/06/2023    Personal history of other diseases of the  circulatory system 03/13/2014    History of hypertension    Personal history of other diseases of the nervous system and sense organs 07/15/2014    History of acute otitis media    Personal history of other diseases of the respiratory system 03/09/2015    History of pharyngitis    Personal history of other diseases of the respiratory system     Personal history of asthma    Personal history of other diseases of the respiratory system 10/26/2017    History of acute sinusitis    Personal history of other endocrine, nutritional and metabolic disease 06/30/2014    History of hyperlipidemia    Personal history of other endocrine, nutritional and metabolic disease 03/09/2015    History of obesity    Personal history of other infectious and parasitic diseases 03/09/2015    History of viral infection    Personal history of other mental and behavioral disorders 03/09/2015    History of attention deficit disorder    Personal history of traumatic brain injury 10/02/2014    History of concussion    Proteinuria, unspecified 03/13/2014    Protein in urine    Proteinuria, unspecified 03/13/2014    Proteinuria    Snoring 09/06/2023     Social History     Tobacco Use    Smoking status: Never    Smokeless tobacco: Never   Substance Use Topics    Drug use: Never      Family History   Problem Relation Name Age of Onset    Other (MTHFR Defiency) Mother        Review of Systems  10 point ROS negative except as otherwise noted in the HPI.      Objective   /90   Pulse 68   Temp 36.7 °C (98.1 °F)   Wt (!) 164 kg (361 lb)   SpO2 98%   BMI 53.31 kg/m²    Physical Exam  Constitutional:       General: He is not in acute distress.     Appearance: Normal appearance. He is not ill-appearing or toxic-appearing.   HENT:      Head: Normocephalic and atraumatic.      Right Ear: Tympanic membrane, ear canal and external ear normal.      Left Ear: Ear canal and external ear normal. There is impacted cerumen.      Nose: Congestion present. No  rhinorrhea.      Mouth/Throat:      Mouth: Mucous membranes are moist.      Pharynx: Oropharynx is clear. No oropharyngeal exudate or posterior oropharyngeal erythema.   Eyes:      Conjunctiva/sclera: Conjunctivae normal.      Pupils: Pupils are equal, round, and reactive to light.   Neck:      Vascular: No carotid bruit.   Cardiovascular:      Rate and Rhythm: Normal rate and regular rhythm.      Pulses: Normal pulses.      Heart sounds: Normal heart sounds. No murmur heard.  Pulmonary:      Effort: Pulmonary effort is normal.      Breath sounds: Wheezing present. No rhonchi or rales.      Comments: + cough  Abdominal:      General: Bowel sounds are normal. There is no distension.      Palpations: Abdomen is soft. There is no mass.      Tenderness: There is no abdominal tenderness. There is no guarding or rebound.   Musculoskeletal:         General: Normal range of motion.      Cervical back: Normal range of motion and neck supple. No tenderness.      Right lower leg: No edema.      Left lower leg: No edema.   Lymphadenopathy:      Cervical: No cervical adenopathy.   Skin:     General: Skin is warm and dry.      Findings: No rash.   Neurological:      Mental Status: He is alert and oriented to person, place, and time.   Psychiatric:         Mood and Affect: Mood normal.         Behavior: Behavior normal.           Assessment/Plan   Problem List Items Addressed This Visit    None  Visit Diagnoses       Cough, unspecified type    -  Primary  - Pt with cough, sweats, congestion, diarrhea since saturday night/sunday AM   - Lungs are wheezy throughout, but otherwise exam normal. No abdominal tenderness. No vomiting.   - Suspect viral at this time. Swab for covid/flu. Will send albuterol for wheezing and hold off on abx for now until we get viral swabs back  - supportive care recommendations given     Relevant Medications    albuterol (Ventolin HFA) 90 mcg/actuation inhaler    Other Relevant Orders    Sars-CoV-2 and  Influenza A/B PCR            Discussed at visit any disease processes that were of concern as well as the risks, benefits and instructions on any new medication provided. Patient (and/or caretaker of patient if present) stated all questions were answered, and they voiced understanding of instructions.     B[place] PA-C

## 2025-01-22 LAB
FLUAV RNA RESP QL NAA+PROBE: NOT DETECTED
FLUBV RNA RESP QL NAA+PROBE: NOT DETECTED
SARS-COV-2 RNA RESP QL NAA+PROBE: NOT DETECTED

## 2025-01-27 ENCOUNTER — TELEPHONE (OUTPATIENT)
Dept: PRIMARY CARE | Facility: CLINIC | Age: 25
End: 2025-01-27
Payer: COMMERCIAL

## 2025-01-27 DIAGNOSIS — Z71.1 CONCERN ABOUT STD IN MALE WITHOUT DIAGNOSIS: Primary | ICD-10-CM

## 2025-01-27 DIAGNOSIS — R05.9 COUGH, UNSPECIFIED TYPE: Primary | ICD-10-CM

## 2025-01-27 RX ORDER — AZITHROMYCIN 250 MG/1
TABLET, FILM COATED ORAL
Qty: 6 TABLET | Refills: 0 | Status: SHIPPED | OUTPATIENT
Start: 2025-01-27 | End: 2025-02-01

## 2025-01-27 RX ORDER — PREDNISONE 20 MG/1
40 TABLET ORAL DAILY
Qty: 10 TABLET | Refills: 0 | Status: SHIPPED | OUTPATIENT
Start: 2025-01-27 | End: 2025-02-01

## 2025-01-27 NOTE — TELEPHONE ENCOUNTER
Patient is not any better on the inhaler. He said he is coughing up stuff and just not feeling good.

## 2025-02-14 ENCOUNTER — TELEPHONE (OUTPATIENT)
Dept: PRIMARY CARE | Facility: CLINIC | Age: 25
End: 2025-02-14
Payer: COMMERCIAL

## 2025-02-14 NOTE — TELEPHONE ENCOUNTER
Dr. Gresham's MA called and said that he can not see this patient as he only treats cancer. Please send referral to another urologist.

## 2025-02-18 ENCOUNTER — APPOINTMENT (OUTPATIENT)
Dept: UROLOGY | Facility: CLINIC | Age: 25
End: 2025-02-18
Payer: COMMERCIAL

## 2025-04-18 ENCOUNTER — HOSPITAL ENCOUNTER (EMERGENCY)
Age: 25
Discharge: HOME | End: 2025-04-18
Payer: COMMERCIAL

## 2025-04-18 ENCOUNTER — APPOINTMENT (OUTPATIENT)
Dept: GENERAL RADIOLOGY | Age: 25
End: 2025-04-18
Payer: COMMERCIAL

## 2025-04-18 VITALS
WEIGHT: 315 LBS | HEART RATE: 65 BPM | RESPIRATION RATE: 20 BRPM | TEMPERATURE: 98.2 F | SYSTOLIC BLOOD PRESSURE: 133 MMHG | BODY MASS INDEX: 50.65 KG/M2 | OXYGEN SATURATION: 97 % | DIASTOLIC BLOOD PRESSURE: 92 MMHG

## 2025-04-18 DIAGNOSIS — S61.213A LACERATION OF LEFT MIDDLE FINGER WITHOUT FOREIGN BODY WITHOUT DAMAGE TO NAIL, INITIAL ENCOUNTER: Primary | ICD-10-CM

## 2025-04-18 PROCEDURE — 6360000002 HC RX W HCPCS: Performed by: NURSE PRACTITIONER

## 2025-04-18 PROCEDURE — 73140 X-RAY EXAM OF FINGER(S): CPT

## 2025-04-18 PROCEDURE — 90471 IMMUNIZATION ADMIN: CPT | Performed by: NURSE PRACTITIONER

## 2025-04-18 PROCEDURE — 12013 RPR F/E/E/N/L/M 2.6-5.0 CM: CPT

## 2025-04-18 PROCEDURE — 90715 TDAP VACCINE 7 YRS/> IM: CPT | Performed by: NURSE PRACTITIONER

## 2025-04-18 PROCEDURE — 99212 OFFICE O/P EST SF 10 MIN: CPT

## 2025-04-18 RX ORDER — CEPHALEXIN 500 MG/1
500 CAPSULE ORAL 4 TIMES DAILY
Qty: 28 CAPSULE | Refills: 0 | Status: SHIPPED | OUTPATIENT
Start: 2025-04-18 | End: 2025-04-25

## 2025-04-18 RX ORDER — LIDOCAINE HYDROCHLORIDE 10 MG/ML
5 INJECTION, SOLUTION INFILTRATION; PERINEURAL ONCE
Status: COMPLETED | OUTPATIENT
Start: 2025-04-18 | End: 2025-04-18

## 2025-04-18 RX ADMIN — TETANUS TOXOID, REDUCED DIPHTHERIA TOXOID AND ACELLULAR PERTUSSIS VACCINE, ADSORBED 0.5 ML: 5; 2.5; 8; 8; 2.5 SUSPENSION INTRAMUSCULAR at 13:16

## 2025-04-18 RX ADMIN — LIDOCAINE HYDROCHLORIDE 5 ML: 10 INJECTION, SOLUTION INFILTRATION; PERINEURAL at 13:15

## 2025-04-18 ASSESSMENT — PAIN - FUNCTIONAL ASSESSMENT: PAIN_FUNCTIONAL_ASSESSMENT: 0-10

## 2025-04-18 ASSESSMENT — PAIN SCALES - GENERAL: PAINLEVEL_OUTOF10: 3

## 2025-04-18 NOTE — ED PROVIDER NOTES
Independent AUTUMN Visit.         [facility]  ED  Encounter Note  Admit Date/RoomTime: 2025 11:07 AM  ED Room:   NAME: Nilson De La Vega  : 2000  MRN: [id number]  PCP: Jeffrey Wagner Jr.,     CHIEF COMPLAINT     Laceration (Pt stated that he cut his left 3 rd digit while at work on a saw. )    HISTORY OF PRESENT ILLNESS        Nilson De La Vega is a 24 y.o. male who presents to the ED  for laceration to the left 3 rd finger while work using [employer] saw. Pt states the saw grabbed the part he was cutting and pulled his finger.  Pt is right handed and not up to date on tetanus. Denies any other injuries at this time.   REVIEW OF SYSTEMS     Pertinent positives and negatives are stated within HPI, all other systems reviewed and are negative.    Past Medical History:  has a past medical history of ADHD (attention deficit hyperactivity disorder), Asthma, Class 3 severe obesity due to excess calories without serious comorbidity with body mass index (BMI) of 60.0 to 69.9 in adult, and ALESSANDRO (obstructive sleep apnea).  Surgical History:  has no past surgical history on file.  Social History:  reports that he has never smoked. He has never used smokeless tobacco. He reports that he does not drink alcohol and does not use drugs.  Family History: family history is not on file.   Allergies: Peanut-containing drug products and Shellfish-derived products  CURRENT MEDICATIONS       Discharge Medication List as of 2025  1:13 PM        CONTINUE these medications which have NOT CHANGED    Details   amoxicillin (AMOXIL) 500 MG capsule Take 500 mg by mouth 3 times dailyHistorical Med      HYDROcodone-acetaminophen (NORCO) 5-325 MG per tablet Take 1 tablet by mouth every 6 hours as needed for Pain.Historical Med      ibuprofen (ADVIL;MOTRIN) 800 MG tablet Take 800 mg by mouth every 6 hours as needed for PainHistorical Med      topiramate (TOPAMAX) 25 MG tablet Take 2 tablets by mouth 2 times daily,  grabbed the part he was cutting and pulled his finger.  Pt is right handed and not up to date on tetanus. Denies any other injuries at this time.     Diff Dx: Amputation of finger, fracture of finger  X-ray of the left finger completed-results reviewed.  Impression acute traumatic injury of the soft tissue adjacent to the distal phalanx margins intact, no acute fracture speckles are present possible foreign bodies or fragments.  Wound flushed prior to suturing aggressively patient tolerated well.  Patient was given tetanus, ordered Keflex 500 mg 1 capsule by mouth 4 times daily for 7 days.  Patient to follow-up to have sutures removed in 7 days.  Impression is laceration left middle finger without foreign body without nail damage    I have spoken with the patient/caregiver and discussed today’s results, in addition to providing specific details for the plan of care and counseling regarding the diagnosis and prognosis and are agreeable with the plan. All results reviewed with pt and all questions answered.  I discussed the differential, results and discharge plan with the patient and/or family/friend/caregiver if present.  I emphasized the importance of follow-up with the physician I referred them to in the timeframe recommended.  I explained reasons for the patient to return to the Emergency Department. Additional verbal discharge instructions were also given and discussed with the patient to supplement those generated by the EMR. We also discussed medications that were prescribed (if any) including common side effects and interactions. The patient was advised to abstain from driving, operating heavy machinery or making significant decisions while taking medications such as opiates and muscle relaxers that may impair this. All questions were addressed.  They understand return precautions and discharge instructions. The patient and/or family/friend/caregiver expressed understanding. Vitals were stable and they were

## 2025-04-25 ENCOUNTER — HOSPITAL ENCOUNTER (EMERGENCY)
Age: 25
Discharge: HOME | End: 2025-04-25
Payer: COMMERCIAL

## 2025-04-25 VITALS
WEIGHT: 315 LBS | BODY MASS INDEX: 50.21 KG/M2 | DIASTOLIC BLOOD PRESSURE: 67 MMHG | RESPIRATION RATE: 16 BRPM | SYSTOLIC BLOOD PRESSURE: 123 MMHG | TEMPERATURE: 98.2 F | HEART RATE: 69 BPM | OXYGEN SATURATION: 97 %

## 2025-04-25 DIAGNOSIS — Z48.02 VISIT FOR SUTURE REMOVAL: Primary | ICD-10-CM

## 2025-04-25 PROCEDURE — 99211 OFF/OP EST MAY X REQ PHY/QHP: CPT

## 2025-04-25 ASSESSMENT — PAIN - FUNCTIONAL ASSESSMENT: PAIN_FUNCTIONAL_ASSESSMENT: NONE - DENIES PAIN

## 2025-04-25 NOTE — ED PROVIDER NOTES
Independent AUTUMN Visit.       [facility]  ED  Encounter Note  Admit Date/RoomTime: 2025  4:48 PM  ED Room:   NAME: Nilson De La Vega  : 2000  MRN: [id number]  PCP: Jeffrey Wagner Jr.,     CHIEF COMPLAINT     Suture / Staple Removal (Sutures placed tip of middle finger 7 days ago Need removed )    HISTORY OF PRESENT ILLNESS        Nilson De La Vega is a 24 y.o. male who presents to the ED for suture removal. Pt states his finger healed well and is experiencing not pain.     REVIEW OF SYSTEMS     Pertinent positives and negatives are stated within HPI, all other systems reviewed and are negative.    Past Medical History:  has a past medical history of ADHD (attention deficit hyperactivity disorder), Asthma, Class 3 severe obesity due to excess calories without serious comorbidity with body mass index (BMI) of 60.0 to 69.9 in adult (HCC), and ALESSANDRO (obstructive sleep apnea).  Surgical History:  has no past surgical history on file.  Social History:  reports that he has never smoked. He has never used smokeless tobacco. He reports that he does not drink alcohol and does not use drugs.  Family History: family history is not on file.   Allergies: Peanut-containing drug products and Shellfish-derived products  CURRENT MEDICATIONS       Discharge Medication List as of 2025  5:11 PM        CONTINUE these medications which have NOT CHANGED    Details   cephALEXin (KEFLEX) 500 MG capsule Take 1 capsule by mouth 4 times daily for 7 days, Disp-28 capsule, R-0Normal      amoxicillin (AMOXIL) 500 MG capsule Take 500 mg by mouth 3 times dailyHistorical Med      HYDROcodone-acetaminophen (NORCO) 5-325 MG per tablet Take 1 tablet by mouth every 6 hours as needed for Pain.Historical Med      ibuprofen (ADVIL;MOTRIN) 800 MG tablet Take 800 mg by mouth every 6 hours as needed for PainHistorical Med      topiramate (TOPAMAX) 25 MG tablet Take 2 tablets by mouth 2 times daily, Disp-360 tablet, R-1Normal     addressed.  They understand return precautions and discharge instructions. The patient and/or family/friend/caregiver expressed understanding. Vitals were stable and they were in no distress at discharge. Findings were discussed with the patient and reasons to immediately return to the ED were articulated to them.     I used an evidence-based tool along with my training and experience to weigh the risk of discharge against the risks of further testing, imaging, or hospitalization. At this time, I estimate the risks of additional testing, imaging, or hospitalization to be equal to or greater than the risk of discharge.  I discussed my risk assessment with the patient and the patient consents to the risk of discharge as well as the risk of uncertainty in estimating outcomes. At this time, the risk of acute decompensation with death before the patient can return for re-evaluation is most likely lower than the risk of death attributable to being in the hospital.     Plan of Care/Counseling:  Helen Morrow NP reviewed today's visit with the patient in addition to providing specific details for the plan of care and counseling regarding the diagnosis and prognosis.  Questions are answered at this time and are agreeable with the plan.    ASSESSMENT     1. Visit for suture removal        DISPOSITION   Discharged home.  Patient condition is stable.    Discharge Instructions:   [place]  [address] [facility] [address]  [phone]          [facility]  [address] [place] [id number] [phone]        NEW MEDICATIONS     Discharge Medication List as of 4/25/2025  5:11 PM        Electronically signed by RAO Aranda CNP   DD: 4/25/25  **This report was transcribed using voice recognition software. Every effort was made to ensure accuracy; however, inadvertent computerized transcription errors may be present.  END OF ED PROVIDER NOTE

## 2025-06-20 ENCOUNTER — TELEPHONE (OUTPATIENT)
Dept: PRIMARY CARE | Facility: CLINIC | Age: 25
End: 2025-06-20
Payer: COMMERCIAL

## 2025-06-20 DIAGNOSIS — B00.1 RECURRENT HERPES LABIALIS: Primary | ICD-10-CM

## 2025-06-20 RX ORDER — VALACYCLOVIR HYDROCHLORIDE 500 MG/1
500 TABLET, FILM COATED ORAL DAILY
Qty: 30 TABLET | Refills: 11 | Status: SHIPPED | OUTPATIENT
Start: 2025-06-20 | End: 2026-06-20

## 2025-06-20 NOTE — TELEPHONE ENCOUNTER
Would you prescribe me a monthly prescription for an HSV1 supressant? Or is this something that I would have to come in and see you for? Let me know.